# Patient Record
Sex: MALE | Race: WHITE | NOT HISPANIC OR LATINO | URBAN - METROPOLITAN AREA
[De-identification: names, ages, dates, MRNs, and addresses within clinical notes are randomized per-mention and may not be internally consistent; named-entity substitution may affect disease eponyms.]

---

## 2018-08-27 ENCOUNTER — TRANSCRIBE ORDERS (OUTPATIENT)
Dept: PREADMISSION TESTING | Facility: HOSPITAL | Age: 53
End: 2018-08-27

## 2018-08-27 ENCOUNTER — HOSPITAL ENCOUNTER (OUTPATIENT)
Dept: CARDIOLOGY | Facility: HOSPITAL | Age: 53
Discharge: HOME | End: 2018-08-27
Attending: UROLOGY
Payer: COMMERCIAL

## 2018-08-27 ENCOUNTER — APPOINTMENT (OUTPATIENT)
Dept: LAB | Facility: HOSPITAL | Age: 53
End: 2018-08-27
Attending: UROLOGY
Payer: COMMERCIAL

## 2018-08-27 ENCOUNTER — OFFICE VISIT (OUTPATIENT)
Dept: PREADMISSION TESTING | Facility: HOSPITAL | Age: 53
End: 2018-08-27
Attending: UROLOGY
Payer: COMMERCIAL

## 2018-08-27 VITALS
DIASTOLIC BLOOD PRESSURE: 100 MMHG | HEIGHT: 72 IN | RESPIRATION RATE: 16 BRPM | BODY MASS INDEX: 30.71 KG/M2 | WEIGHT: 226.7 LBS | TEMPERATURE: 97.4 F | HEART RATE: 75 BPM | SYSTOLIC BLOOD PRESSURE: 138 MMHG

## 2018-08-27 DIAGNOSIS — C61 MALIGNANT NEOPLASM OF PROSTATE (CMS/HCC): ICD-10-CM

## 2018-08-27 DIAGNOSIS — Z01.818 ENCOUNTER FOR PREADMISSION TESTING: Primary | ICD-10-CM

## 2018-08-27 DIAGNOSIS — C61 MALIGNANT NEOPLASM OF PROSTATE (CMS/HCC): Primary | ICD-10-CM

## 2018-08-27 PROBLEM — Z21 HIV (HUMAN IMMUNODEFICIENCY VIRUS INFECTION) (CMS/HCC): Status: ACTIVE | Noted: 2018-08-27

## 2018-08-27 PROBLEM — R03.0 ELEVATED BP WITHOUT DIAGNOSIS OF HYPERTENSION: Status: ACTIVE | Noted: 2018-08-27

## 2018-08-27 PROBLEM — I45.10 RIGHT BUNDLE BRANCH BLOCK (RBBB) ON ELECTROCARDIOGRAM (ECG): Status: ACTIVE | Noted: 2018-08-27

## 2018-08-27 LAB
ABO + RH BLD: NORMAL
ANION GAP SERPL CALC-SCNC: 10 MEQ/L (ref 3–15)
ATRIAL RATE: 75
BASOPHILS # BLD: 0.08 K/UL (ref 0.01–0.1)
BASOPHILS NFR BLD: 1.1 %
BILIRUB UR QL STRIP.AUTO: NEGATIVE MG/DL
BLD GP AB SCN SERPL QL: NEGATIVE
BLOOD BANK CMNT PATIENT-IMP: NORMAL
BUN SERPL-MCNC: 14 MG/DL (ref 8–20)
CALCIUM SERPL-MCNC: 9.4 MG/DL (ref 8.9–10.3)
CHLORIDE SERPL-SCNC: 99 MMOL/L (ref 98–109)
CLARITY UR REFRACT.AUTO: CLEAR
CO2 SERPL-SCNC: 28 MMOL/L (ref 22–32)
COLOR UR AUTO: YELLOW
CREAT SERPL-MCNC: 1.3 MG/DL (ref 0.8–1.3)
D AG BLD QL: POSITIVE
DIFFERENTIAL METHOD BLD: NORMAL
EOSINOPHIL # BLD: 0.27 K/UL (ref 0.04–0.54)
EOSINOPHIL NFR BLD: 3.6 %
ERYTHROCYTE [DISTWIDTH] IN BLOOD BY AUTOMATED COUNT: 12.5 % (ref 11.6–14.4)
GFR SERPL CREATININE-BSD FRML MDRD: 57.7 ML/MIN/1.73M*2
GLUCOSE SERPL-MCNC: 96 MG/DL (ref 70–99)
GLUCOSE UR STRIP.AUTO-MCNC: NEGATIVE MG/DL
HCT VFR BLDCO AUTO: 41.4 % (ref 40.1–51)
HGB BLD-MCNC: 13.6 G/DL (ref 13.7–17.5)
HGB UR QL STRIP.AUTO: NEGATIVE
IMM GRANULOCYTES # BLD AUTO: 0.04 K/UL (ref 0–0.08)
IMM GRANULOCYTES NFR BLD AUTO: 0.5 %
KETONES UR STRIP.AUTO-MCNC: NEGATIVE MG/DL
LABORATORY COMMENT REPORT: NORMAL
LEUKOCYTE ESTERASE UR QL STRIP.AUTO: NEGATIVE
LYMPHOCYTES # BLD: 1.83 K/UL (ref 1.2–3.5)
LYMPHOCYTES NFR BLD: 24.5 %
MCH RBC QN AUTO: 31 PG (ref 28–33.2)
MCHC RBC AUTO-ENTMCNC: 32.9 G/DL (ref 32.2–36.5)
MCV RBC AUTO: 94.3 FL (ref 83–98)
MONOCYTES # BLD: 0.75 K/UL (ref 0.3–1)
MONOCYTES NFR BLD: 10.1 %
NEUTROPHILS # BLD: 4.49 K/UL (ref 1.7–7)
NEUTS SEG NFR BLD: 60.2 %
NITRITE UR QL STRIP.AUTO: NEGATIVE
NRBC BLD-RTO: 0 %
P AXIS: 27
PDW BLD AUTO: 10.2 FL (ref 9.4–12.4)
PH UR STRIP.AUTO: 6 [PH]
PLATELET # BLD AUTO: 263 K/UL (ref 150–350)
POTASSIUM SERPL-SCNC: 4 MMOL/L (ref 3.6–5.1)
PR INTERVAL: 198
PROT UR QL STRIP.AUTO: NEGATIVE
QRS DURATION: 174
QT INTERVAL: 448
QTC CALCULATION(BAZETT): 500
R AXIS: 66
RBC # BLD AUTO: 4.39 M/UL (ref 4.5–5.8)
SODIUM SERPL-SCNC: 137 MMOL/L (ref 136–144)
SP GR UR REFRACT.AUTO: 1.02
T WAVE AXIS: 17
UROBILINOGEN UR STRIP-ACNC: 0.2 EU/DL
VENTRICULAR RATE: 75
WBC # BLD AUTO: 7.46 K/UL (ref 3.8–10.5)

## 2018-08-27 PROCEDURE — 93010 ELECTROCARDIOGRAM REPORT: CPT | Performed by: INTERNAL MEDICINE

## 2018-08-27 PROCEDURE — 81003 URINALYSIS AUTO W/O SCOPE: CPT

## 2018-08-27 PROCEDURE — 36415 COLL VENOUS BLD VENIPUNCTURE: CPT

## 2018-08-27 PROCEDURE — 86901 BLOOD TYPING SEROLOGIC RH(D): CPT

## 2018-08-27 PROCEDURE — 87086 URINE CULTURE/COLONY COUNT: CPT

## 2018-08-27 PROCEDURE — 93005 ELECTROCARDIOGRAM TRACING: CPT

## 2018-08-27 PROCEDURE — 80048 BASIC METABOLIC PNL TOTAL CA: CPT | Mod: 59

## 2018-08-27 PROCEDURE — 99244 OFF/OP CNSLTJ NEW/EST MOD 40: CPT | Performed by: HOSPITALIST

## 2018-08-27 PROCEDURE — 85025 COMPLETE CBC W/AUTO DIFF WBC: CPT

## 2018-08-27 RX ORDER — ALLOPURINOL 100 MG/1
200 TABLET ORAL DAILY
COMMUNITY

## 2018-08-27 RX ORDER — LANSOPRAZOLE 30 MG/1
30 CAPSULE, DELAYED RELEASE ORAL
COMMUNITY

## 2018-08-27 NOTE — ASSESSMENT & PLAN NOTE
EKG revealed normal sinus rhythm at a rate of 75 with a QTc of 500 and a right bundle branch block.  We have no previous EKG to compare, so it is unclear if this right bundle is new.  He currently has no cardiac symptoms concerning for ischemic disease at this time.  I do not believe this RBBB is significantly pathologic.  Please obtain new EKG prior to surgery to compare.  Please be cautious of using any QTc prolonging agents if his QTc remains prolonged prior to surgery.

## 2018-08-27 NOTE — ASSESSMENT & PLAN NOTE
Patient is currently on HAART therapy (Triumeq) being treated at [facility].  He is compliant with medications.  He states his counts are stable as this is viral load.  We have no labs available in our system for him.  He should continue his HAART therapy in the hospital.  He was instructed to bring his own medication as this is not likely not formulary at [facility].

## 2018-08-27 NOTE — PRE-PROCEDURE INSTRUCTIONS
1. We will call you between 3 pm and 7 pm on August 31, 2018 to determine that arrival time for your procedure. If you do not hear by 4:30 PM. Please call [phone] for arrival time.    2. Please report to [place] in [place] ROSSANA / ana, walk into [facility] and report to the admission desk on first floor on the day of your procedure.   3. Please follow the following fasting guidelines:   Nothing to eat or drink after midnight unless otherwise instructed by  your physician. No gum mints candy. Brush teeth/Rinse Mouth   4. Early on the morning of the procedure please take your usual dose of the listed medications with a sip of water:    Prevacid  Bring Triumeq with you       5. Other Instructions: No asprin aleve ibuprofen. Tylenol OK   6. If you develop a cold, cough, fever, rash, or other symptom prior to the data of the procedure, please report it to your physician immediately.   7. If you need to cancel the procedure for any reason, please contact your physician or call the unit listed above.   8. Make arrangements to have someone drive you home from the procedure. If you have not arranged for transportation home, your surgery may be cancelled.    9. You may not take public transportation unless accompanied by a responsible person.   10. You may not drive a car or operate complex or potentially dangerous machinery for 24 hours following anesthesia and/or sedation.   11. If it is medically necessary for you to have a longer stay, you will be informed as soon as the decision is made.   12. Do not wear or bring anything of value to the hospital including jewelry of any kind. Do not wear make-up or contact lenses. DO bring your glasses and hearing aid.   13. No lotion, creams, powders, or oils on skin the morning of procedure    14. Dress in comfortable clothes.   15.  If instructed, please bring a copy of your Advanced Directive (Living Will/Durable Power of ) on the day of your procedure.      Pre operative  instructions given as per protocol.  Form explained by: CORNELIO Samuel     I have read and understand the above information. I have had sufficient opportunity to ask questions I might have and they have been answered to my satisfaction. I agree to comply with the Patient Responsibilities listed above and have received a copy of this form.

## 2018-08-27 NOTE — ASSESSMENT & PLAN NOTE
Patient presents for a preoperative exam prior to his robot-assisted prostatectomy with pelvic lymph node dissection.  Remainder of his medical issues are as below.

## 2018-08-27 NOTE — CONSULTS
[facility] Medicine Service -  Pre-Operative Consultation       Patient Name: Celestino Hawkins  Referring Surgeon: Dr. Rafita Cole    Reason for Referral: Pre-Operative Evaluation  Surgical Procedure: Da Trevon robot-assisted prostatectomy with possible pelvic lymph node dissection.  Operative Date: 9/4/2018.  Other Providers:      PCP: Pt States, No Pcp          HISTORY OF PRESENT ILLNESS      Celestino Hawkins is a 52 y.o. male presenting today to the [facility] at [facility] for pre-operative evaluation prior to planned surgery.    He currently feels well at this time.  He denies any urinary symptoms of frequency, urgency, hematuria, or dysuria.  Currently his only medication is Triumeq for his HIV.    In regards to medical history:  · HIV currently on HAART followed by infectious disease at [facility]  · MRSA of his lower extremity diagnosed in 2011    The patient denies any current or recent chest pain or pressure, dyspnea, cough, sputum, fevers, chills, abdominal pain, nausea, vomiting, diarrhea or other symptoms.     Functionally, the patient is able to ascend a flight or so of stairs with no dyspnea or chest pain.     The patient denies, on specific questioning, the following:  No history of MI, arrhythmia,or CHF.  No history of MILENA.  No history of DVT/PE.  No history of COPD.  No history of CVA.  No history of DM.   No history of CKD.     PAST MEDICAL AND SURGICAL HISTORY      Past Medical History:   Diagnosis Date   • Borderline hypertension     no meds   • Diarrhea due to drug    • HIV (human immunodeficiency virus infection) (CMS/HCC) (HCC) 2000   • MRSA (methicillin resistant Staphylococcus aureus) 2011    RLE   • PONV (postoperative nausea and vomiting)        Past Surgical History:   Procedure Laterality Date   • COLONOSCOPY     • LUMBAR EPIDURAL INJECTION     • WISDOM TOOTH EXTRACTION         MEDICATIONS        Current Outpatient  Prescriptions:   •  abacavir-dolutegravir-lamivud (TRIUMEQ) 600- mg tablet tablet, Take by mouth daily., Disp: , Rfl:   •  allopurinol (ZYLOPRIM) 100 mg tablet, Take 200 mg by mouth daily., Disp: , Rfl:   •  crofelemer (MYTESI) 125 mg tablet,delayed release (DR/EC), Take 125 mg by mouth 2 (two) times a day., Disp: , Rfl:   •  lansoprazole (PREVACID) 30 mg capsule, Take 30 mg by mouth daily before breakfast., Disp: , Rfl:     ALLERGIES      Patient has no known allergies.    FAMILY HISTORY      family history is not on file.    Denies any prior known family history of DVTs/PEs/clotting disorder    SOCIAL HISTORY      Social History   Substance Use Topics   • Smoking status: Never Smoker   • Smokeless tobacco: Never Used   • Alcohol use No       REVIEW OF SYSTEMS      All other systems reviewed and negative except as noted in HPI    PHYSICAL EXAMINATION      BP (!) 138/100 (BP Location: Left upper arm, Patient Position: Sitting)   Pulse 75   Temp 36.3 °C (97.4 °F)   Resp 16 Comment: 96%  Ht 1.829 m (6')   Wt 103 kg (226 lb 11.2 oz)   BMI 30.75 kg/m²   Body mass index is 30.75 kg/m².  GEN: well-developed and well-nourished; not in acute distress  HEENT: normocephalic; atraumatic  NECK: no JVD; no bruits  CARDIO: regular rate and rhythm; no murmurs or rubs  RESP: clear to auscultation bilaterally; no rales, rhonchi, or wheezes  ABD: soft, non-distended, non-tender, normal bowel sounds  EXT: no cyanosis, clubbing, or edema  SKIN: clean, dry, warm, and intact  MUSCULOSKELETAL: no injury or deformity  NEURO: alert and oriented x 3; nonfocal  BEHAVIOR/EMOTIONAL: appropriate; cooperative    LABS / EKG        Labs  Lab Results   Component Value Date     08/27/2018    K 4.0 08/27/2018    CL 99 08/27/2018    BUN 14 08/27/2018    CREATININE 1.3 08/27/2018    WBC 7.46 08/27/2018    HGB 13.6 (L) 08/27/2018    HCT 41.4 08/27/2018     08/27/2018       ECG/Telemetry  Normal sinus rhythm at a rate of 75 bpm,  QTC is 500, right bundle branch block is present.  There are no old EKGs to compare to this.    ASSESSMENT AND PLAN         Preoperative examination  Patient presents for a preoperative exam prior to his robot-assisted prostatectomy with pelvic lymph node dissection.  Remainder of his medical issues are as below.    HIV (human immunodeficiency virus infection) (CMS/HCC) (HCC)  Patient is currently on HAART therapy (Triumeq) being treated at [facility].  He is compliant with medications.  He states his counts are stable as this is viral load.  We have no labs available in our system for him.  He should continue his HAART therapy in the hospital.  He was instructed to bring his own medication as this is not likely not formulary at [facility].    Prostate cancer (CMS/HCC) (HCC)  Prostate cancer officially diagnosed in 5/2018 after biopsy due to an elevated PSA over the past year.  We have no record of pathology reports in our EMR.  He is set to undergo a robot-assisted prostatectomy with possible lymph node dissection on 9/4/2018 with Dr. Rafita Cole.    Right bundle branch block (RBBB) on electrocardiogram (ECG)  EKG revealed normal sinus rhythm at a rate of 75 with a QTc of 500 and a right bundle branch block.  We have no previous EKG to compare, so it is unclear if this right bundle is new.  He currently has no cardiac symptoms concerning for ischemic disease at this time.  I do not believe this RBBB is significantly pathologic.  Please obtain new EKG prior to surgery to compare.  Please be cautious of using any QTc prolonging agents if his QTc remains prolonged prior to surgery.       In regards to perioperative cardiac risk:  The patient denies any history of ischemic heart disease, denies any history of CHF, denies any history of CVA, is not on pre-operative treatment with insulin, and does not have a pre-operative creatinine > 2 mg/dL.   The Revised Cardiac Risk Index (RCRI) for this patient indicates low risk.      Further comments:  Resume supplements when OK with surgical team.  I would encourage incentive spirometry to assist with minimizing alen-operative pulmonary risk.  DVT prophylaxis and timing of such per the discretion of the surgeon.     Please do not hesitate to contact [facility] during the upcoming hospitalization with any questions or concerns.     Ramos Villatoro MD  8/28/2018

## 2018-08-27 NOTE — ASSESSMENT & PLAN NOTE
Prostate cancer officially diagnosed in 5/2018 after biopsy due to an elevated PSA over the past year.  We have no record of pathology reports in our EMR.  He is set to undergo a robot-assisted prostatectomy with possible lymph node dissection on 9/4/2018 with Dr. Rafita Cole.

## 2018-08-28 LAB — BACTERIA UR CULT: NORMAL

## 2018-09-04 ENCOUNTER — ANESTHESIA EVENT (INPATIENT)
Dept: OPERATING ROOM | Facility: HOSPITAL | Age: 53
DRG: 707 | End: 2018-09-04
Payer: COMMERCIAL

## 2018-09-04 ENCOUNTER — ANESTHESIA (INPATIENT)
Dept: OPERATING ROOM | Facility: HOSPITAL | Age: 53
DRG: 707 | End: 2018-09-04
Payer: COMMERCIAL

## 2018-09-04 ENCOUNTER — HOSPITAL ENCOUNTER (INPATIENT)
Facility: HOSPITAL | Age: 53
LOS: 1 days | Discharge: HOME | DRG: 707 | End: 2018-09-05
Attending: UROLOGY | Admitting: UROLOGY
Payer: COMMERCIAL

## 2018-09-04 DIAGNOSIS — C61 PROSTATE CANCER (CMS/HCC): Primary | ICD-10-CM

## 2018-09-04 DIAGNOSIS — C61 MALIGNANT NEOPLASM PROSTATE (CMS/HCC): ICD-10-CM

## 2018-09-04 LAB
ABO + RH BLD: NORMAL
D AG BLD QL: POSITIVE
LABORATORY COMMENT REPORT: NORMAL

## 2018-09-04 PROCEDURE — 63600000 HC DRUGS/DETAIL CODE: Performed by: UROLOGY

## 2018-09-04 PROCEDURE — 25800000 HC PHARMACY IV SOLUTIONS: Performed by: UROLOGY

## 2018-09-04 PROCEDURE — 63700000 HC SELF-ADMINISTRABLE DRUG: Performed by: UROLOGY

## 2018-09-04 PROCEDURE — 63600000 HC DRUGS/DETAIL CODE

## 2018-09-04 PROCEDURE — 0VT04ZZ RESECTION OF PROSTATE, PERCUTANEOUS ENDOSCOPIC APPROACH: ICD-10-PCS | Performed by: UROLOGY

## 2018-09-04 PROCEDURE — 63600000 HC DRUGS/DETAIL CODE: Performed by: ANESTHESIOLOGY

## 2018-09-04 PROCEDURE — 12000000 HC ROOM AND CARE MED/SURG

## 2018-09-04 PROCEDURE — 25000000 HC PHARMACY GENERAL: Performed by: UROLOGY

## 2018-09-04 PROCEDURE — 25000000 HC PHARMACY GENERAL: Performed by: NURSE ANESTHETIST, CERTIFIED REGISTERED

## 2018-09-04 PROCEDURE — 63600000 HC DRUGS/DETAIL CODE: Performed by: NURSE ANESTHETIST, CERTIFIED REGISTERED

## 2018-09-04 PROCEDURE — 27200000 HC STERILE SUPPLY: Performed by: UROLOGY

## 2018-09-04 PROCEDURE — 8E0W4CZ ROBOTIC ASSISTED PROCEDURE OF TRUNK REGION, PERCUTANEOUS ENDOSCOPIC APPROACH: ICD-10-PCS | Performed by: UROLOGY

## 2018-09-04 PROCEDURE — 71000011 HC PACU PHASE 1 EA ADDL MIN: Performed by: UROLOGY

## 2018-09-04 PROCEDURE — 36000016 HC OR LEVEL 6 EA ADDL MIN: Performed by: UROLOGY

## 2018-09-04 PROCEDURE — 88309 TISSUE EXAM BY PATHOLOGIST: CPT | Performed by: UROLOGY

## 2018-09-04 PROCEDURE — 36000006 HC OR LEVEL 6 INITIAL 30MIN: Performed by: UROLOGY

## 2018-09-04 PROCEDURE — 37000001 HC ANESTHESIA GENERAL: Performed by: UROLOGY

## 2018-09-04 PROCEDURE — 36415 COLL VENOUS BLD VENIPUNCTURE: CPT | Performed by: UROLOGY

## 2018-09-04 PROCEDURE — 71000001 HC PACU PHASE 1 INITIAL 30MIN: Performed by: UROLOGY

## 2018-09-04 RX ORDER — POLYETHYLENE GLYCOL 3350 17 G/17G
17 POWDER, FOR SOLUTION ORAL DAILY
Status: DISCONTINUED | OUTPATIENT
Start: 2018-09-04 | End: 2018-09-05 | Stop reason: HOSPADM

## 2018-09-04 RX ORDER — ABACAVIR AND LAMIVUDINE 600; 300 MG/1; MG/1
1 TABLET, FILM COATED ORAL DAILY
Status: DISCONTINUED | OUTPATIENT
Start: 2018-09-04 | End: 2018-09-05 | Stop reason: HOSPADM

## 2018-09-04 RX ORDER — DEXTROSE 40 %
15-30 GEL (GRAM) ORAL AS NEEDED
Status: DISCONTINUED | OUTPATIENT
Start: 2018-09-04 | End: 2018-09-05 | Stop reason: HOSPADM

## 2018-09-04 RX ORDER — IBUPROFEN 200 MG
16-32 TABLET ORAL AS NEEDED
Status: DISCONTINUED | OUTPATIENT
Start: 2018-09-04 | End: 2018-09-05 | Stop reason: HOSPADM

## 2018-09-04 RX ORDER — ACETAMINOPHEN 325 MG/1
650 TABLET ORAL EVERY 4 HOURS PRN
Status: DISCONTINUED | OUTPATIENT
Start: 2018-09-04 | End: 2018-09-05 | Stop reason: HOSPADM

## 2018-09-04 RX ORDER — HYDROMORPHONE HYDROCHLORIDE 2 MG/ML
INJECTION, SOLUTION INTRAMUSCULAR; INTRAVENOUS; SUBCUTANEOUS AS NEEDED
Status: DISCONTINUED | OUTPATIENT
Start: 2018-09-04 | End: 2018-09-04 | Stop reason: SURG

## 2018-09-04 RX ORDER — ONDANSETRON HYDROCHLORIDE 2 MG/ML
4 INJECTION, SOLUTION INTRAVENOUS
Status: DISCONTINUED | OUTPATIENT
Start: 2018-09-04 | End: 2018-09-04 | Stop reason: HOSPADM

## 2018-09-04 RX ORDER — HYDRALAZINE HYDROCHLORIDE 20 MG/ML
5 INJECTION INTRAMUSCULAR; INTRAVENOUS
Status: COMPLETED | OUTPATIENT
Start: 2018-09-04 | End: 2018-09-04

## 2018-09-04 RX ORDER — ROCURONIUM BROMIDE 10 MG/ML
INJECTION, SOLUTION INTRAVENOUS AS NEEDED
Status: DISCONTINUED | OUTPATIENT
Start: 2018-09-04 | End: 2018-09-04 | Stop reason: SURG

## 2018-09-04 RX ORDER — DEXAMETHASONE SODIUM PHOSPHATE 4 MG/ML
INJECTION, SOLUTION INTRA-ARTICULAR; INTRALESIONAL; INTRAMUSCULAR; INTRAVENOUS; SOFT TISSUE AS NEEDED
Status: DISCONTINUED | OUTPATIENT
Start: 2018-09-04 | End: 2018-09-04 | Stop reason: SURG

## 2018-09-04 RX ORDER — BUPIVACAINE HYDROCHLORIDE 5 MG/ML
INJECTION, SOLUTION PERINEURAL AS NEEDED
Status: DISCONTINUED | OUTPATIENT
Start: 2018-09-04 | End: 2018-09-04 | Stop reason: HOSPADM

## 2018-09-04 RX ORDER — PANTOPRAZOLE SODIUM 40 MG/1
40 TABLET, DELAYED RELEASE ORAL DAILY
Status: DISCONTINUED | OUTPATIENT
Start: 2018-09-04 | End: 2018-09-05 | Stop reason: HOSPADM

## 2018-09-04 RX ORDER — FENTANYL CITRATE 50 UG/ML
50 INJECTION, SOLUTION INTRAMUSCULAR; INTRAVENOUS
Status: DISCONTINUED | OUTPATIENT
Start: 2018-09-04 | End: 2018-09-04 | Stop reason: HOSPADM

## 2018-09-04 RX ORDER — ASPIRIN 81 MG/1
81 TABLET ORAL DAILY
COMMUNITY

## 2018-09-04 RX ORDER — ALLOPURINOL 100 MG/1
200 TABLET ORAL DAILY
Status: DISCONTINUED | OUTPATIENT
Start: 2018-09-04 | End: 2018-09-05 | Stop reason: HOSPADM

## 2018-09-04 RX ORDER — SODIUM CHLORIDE 9 MG/ML
INJECTION, SOLUTION INTRAVENOUS CONTINUOUS
Status: DISCONTINUED | OUTPATIENT
Start: 2018-09-04 | End: 2018-09-04 | Stop reason: HOSPADM

## 2018-09-04 RX ORDER — GLYCOPYRROLATE 0.6MG/3ML
SYRINGE (ML) INTRAVENOUS AS NEEDED
Status: DISCONTINUED | OUTPATIENT
Start: 2018-09-04 | End: 2018-09-04 | Stop reason: SURG

## 2018-09-04 RX ORDER — MIDAZOLAM HYDROCHLORIDE 2 MG/2ML
INJECTION, SOLUTION INTRAMUSCULAR; INTRAVENOUS AS NEEDED
Status: DISCONTINUED | OUTPATIENT
Start: 2018-09-04 | End: 2018-09-04 | Stop reason: SURG

## 2018-09-04 RX ORDER — EPHEDRINE SULFATE 50 MG/ML
INJECTION, SOLUTION INTRAVENOUS AS NEEDED
Status: DISCONTINUED | OUTPATIENT
Start: 2018-09-04 | End: 2018-09-04 | Stop reason: SURG

## 2018-09-04 RX ORDER — DEXTROSE 50 % IN WATER (D50W) INTRAVENOUS SYRINGE
25 AS NEEDED
Status: DISCONTINUED | OUTPATIENT
Start: 2018-09-04 | End: 2018-09-05 | Stop reason: HOSPADM

## 2018-09-04 RX ORDER — NEOSTIGMINE METHYLSULFATE 1 MG/ML
INJECTION INTRAVENOUS AS NEEDED
Status: DISCONTINUED | OUTPATIENT
Start: 2018-09-04 | End: 2018-09-04 | Stop reason: SURG

## 2018-09-04 RX ORDER — ALUMINUM HYDROXIDE, MAGNESIUM HYDROXIDE, AND SIMETHICONE 1200; 120; 1200 MG/30ML; MG/30ML; MG/30ML
30 SUSPENSION ORAL EVERY 4 HOURS PRN
Status: DISCONTINUED | OUTPATIENT
Start: 2018-09-04 | End: 2018-09-05 | Stop reason: HOSPADM

## 2018-09-04 RX ORDER — KETOROLAC TROMETHAMINE 15 MG/ML
15 INJECTION, SOLUTION INTRAMUSCULAR; INTRAVENOUS EVERY 6 HOURS
Status: DISCONTINUED | OUTPATIENT
Start: 2018-09-04 | End: 2018-09-05 | Stop reason: HOSPADM

## 2018-09-04 RX ORDER — ONDANSETRON HYDROCHLORIDE 2 MG/ML
INJECTION, SOLUTION INTRAVENOUS AS NEEDED
Status: DISCONTINUED | OUTPATIENT
Start: 2018-09-04 | End: 2018-09-04 | Stop reason: SURG

## 2018-09-04 RX ORDER — CEFAZOLIN SODIUM/WATER 1 G/10 ML
2 SYRINGE (ML) INTRAVENOUS
Status: COMPLETED | OUTPATIENT
Start: 2018-09-04 | End: 2018-09-04

## 2018-09-04 RX ORDER — OXYCODONE AND ACETAMINOPHEN 5; 325 MG/1; MG/1
1-2 TABLET ORAL EVERY 4 HOURS PRN
Status: DISCONTINUED | OUTPATIENT
Start: 2018-09-04 | End: 2018-09-05 | Stop reason: HOSPADM

## 2018-09-04 RX ORDER — CEFAZOLIN SODIUM/WATER 2 G/20 ML
2 SYRINGE (ML) INTRAVENOUS
Status: COMPLETED | OUTPATIENT
Start: 2018-09-04 | End: 2018-09-05

## 2018-09-04 RX ORDER — PROPOFOL 10 MG/ML
INJECTION, EMULSION INTRAVENOUS AS NEEDED
Status: DISCONTINUED | OUTPATIENT
Start: 2018-09-04 | End: 2018-09-04 | Stop reason: SURG

## 2018-09-04 RX ORDER — HYDROMORPHONE HYDROCHLORIDE 2 MG/ML
0.5 INJECTION, SOLUTION INTRAMUSCULAR; INTRAVENOUS; SUBCUTANEOUS
Status: DISCONTINUED | OUTPATIENT
Start: 2018-09-04 | End: 2018-09-04 | Stop reason: HOSPADM

## 2018-09-04 RX ORDER — KETOROLAC TROMETHAMINE 15 MG/ML
INJECTION, SOLUTION INTRAMUSCULAR; INTRAVENOUS
Status: COMPLETED
Start: 2018-09-04 | End: 2018-09-04

## 2018-09-04 RX ORDER — FENTANYL CITRATE 50 UG/ML
INJECTION, SOLUTION INTRAMUSCULAR; INTRAVENOUS AS NEEDED
Status: DISCONTINUED | OUTPATIENT
Start: 2018-09-04 | End: 2018-09-04 | Stop reason: SURG

## 2018-09-04 RX ORDER — HYDROMORPHONE HYDROCHLORIDE 1 MG/ML
.25-.5 INJECTION, SOLUTION INTRAMUSCULAR; INTRAVENOUS; SUBCUTANEOUS
Status: DISCONTINUED | OUTPATIENT
Start: 2018-09-04 | End: 2018-09-05 | Stop reason: HOSPADM

## 2018-09-04 RX ORDER — DEXTROSE MONOHYDRATE, SODIUM CHLORIDE, AND POTASSIUM CHLORIDE 50; 1.49; 4.5 G/1000ML; G/1000ML; G/1000ML
INJECTION, SOLUTION INTRAVENOUS CONTINUOUS
Status: DISCONTINUED | OUTPATIENT
Start: 2018-09-04 | End: 2018-09-05

## 2018-09-04 RX ORDER — LIDOCAINE HYDROCHLORIDE 10 MG/ML
INJECTION, SOLUTION INFILTRATION; PERINEURAL AS NEEDED
Status: DISCONTINUED | OUTPATIENT
Start: 2018-09-04 | End: 2018-09-04 | Stop reason: SURG

## 2018-09-04 RX ADMIN — KETOROLAC TROMETHAMINE 15 MG: 15 INJECTION, SOLUTION INTRAMUSCULAR; INTRAVENOUS at 23:49

## 2018-09-04 RX ADMIN — KETOROLAC TROMETHAMINE 15 MG: 15 INJECTION, SOLUTION INTRAMUSCULAR; INTRAVENOUS at 18:03

## 2018-09-04 RX ADMIN — FENTANYL CITRATE 50 MCG: 50 INJECTION, SOLUTION INTRAMUSCULAR; INTRAVENOUS at 15:15

## 2018-09-04 RX ADMIN — Medication 2 G: at 11:54

## 2018-09-04 RX ADMIN — POTASSIUM CHLORIDE, DEXTROSE MONOHYDRATE AND SODIUM CHLORIDE: 150; 5; 450 INJECTION, SOLUTION INTRAVENOUS at 16:01

## 2018-09-04 RX ADMIN — Medication 3 MG: at 14:54

## 2018-09-04 RX ADMIN — HYDROMORPHONE HYDROCHLORIDE 0.5 MG: 2 INJECTION, SOLUTION INTRAMUSCULAR; INTRAVENOUS; SUBCUTANEOUS at 15:31

## 2018-09-04 RX ADMIN — EPHEDRINE SULFATE 10 MG: 50 INJECTION INTRAVENOUS at 12:18

## 2018-09-04 RX ADMIN — FENTANYL CITRATE 50 MCG: 50 INJECTION, SOLUTION INTRAMUSCULAR; INTRAVENOUS at 12:20

## 2018-09-04 RX ADMIN — DEXAMETHASONE SODIUM PHOSPHATE 4 MG: 4 INJECTION, SOLUTION INTRAMUSCULAR; INTRAVENOUS at 12:00

## 2018-09-04 RX ADMIN — LIDOCAINE HYDROCHLORIDE 3 ML: 10 INJECTION, SOLUTION INFILTRATION; PERINEURAL at 11:54

## 2018-09-04 RX ADMIN — PROPOFOL 200 MG: 10 INJECTION, EMULSION INTRAVENOUS at 11:54

## 2018-09-04 RX ADMIN — ALLOPURINOL 200 MG: 100 TABLET ORAL at 20:29

## 2018-09-04 RX ADMIN — Medication 2 G: at 20:38

## 2018-09-04 RX ADMIN — FENTANYL CITRATE 50 MCG: 50 INJECTION, SOLUTION INTRAMUSCULAR; INTRAVENOUS at 13:58

## 2018-09-04 RX ADMIN — MIDAZOLAM HYDROCHLORIDE 2 MG: 1 INJECTION, SOLUTION INTRAMUSCULAR; INTRAVENOUS at 11:40

## 2018-09-04 RX ADMIN — POLYETHYLENE GLYCOL 3350 17 G: 17 POWDER, FOR SOLUTION ORAL at 18:05

## 2018-09-04 RX ADMIN — HYDRALAZINE HYDROCHLORIDE 5 MG: 20 INJECTION INTRAMUSCULAR; INTRAVENOUS at 15:42

## 2018-09-04 RX ADMIN — ONDANSETRON 4 MG: 2 INJECTION INTRAMUSCULAR; INTRAVENOUS at 14:40

## 2018-09-04 RX ADMIN — ROCURONIUM BROMIDE 10 MG: 10 INJECTION, SOLUTION INTRAVENOUS at 13:23

## 2018-09-04 RX ADMIN — GLYCOPYRROLATE 0.6 MG: 0.2 INJECTION INTRAMUSCULAR; INTRAVENOUS at 14:54

## 2018-09-04 RX ADMIN — HYDROMORPHONE HYDROCHLORIDE 0.5 MG: 2 INJECTION, SOLUTION INTRAMUSCULAR; INTRAVENOUS; SUBCUTANEOUS at 13:07

## 2018-09-04 RX ADMIN — FENTANYL CITRATE 50 MCG: 50 INJECTION INTRAMUSCULAR; INTRAVENOUS at 15:31

## 2018-09-04 RX ADMIN — HYDROMORPHONE HYDROCHLORIDE 0.5 MG: 2 INJECTION, SOLUTION INTRAMUSCULAR; INTRAVENOUS; SUBCUTANEOUS at 13:26

## 2018-09-04 RX ADMIN — PANTOPRAZOLE SODIUM 40 MG: 40 TABLET, DELAYED RELEASE ORAL at 18:26

## 2018-09-04 RX ADMIN — DOLUTEGRAVIR SODIUM 50 MG: 50 TABLET, FILM COATED ORAL at 20:31

## 2018-09-04 RX ADMIN — HYDROMORPHONE HYDROCHLORIDE 0.5 MG: 1 INJECTION, SOLUTION INTRAMUSCULAR; INTRAVENOUS; SUBCUTANEOUS at 21:08

## 2018-09-04 RX ADMIN — HYDROMORPHONE HYDROCHLORIDE 0.5 MG: 2 INJECTION, SOLUTION INTRAMUSCULAR; INTRAVENOUS; SUBCUTANEOUS at 12:48

## 2018-09-04 RX ADMIN — HYDRALAZINE HYDROCHLORIDE 5 MG: 20 INJECTION INTRAMUSCULAR; INTRAVENOUS at 16:11

## 2018-09-04 RX ADMIN — ROCURONIUM BROMIDE 50 MG: 10 INJECTION, SOLUTION INTRAVENOUS at 11:54

## 2018-09-04 RX ADMIN — SODIUM CHLORIDE: 9 INJECTION, SOLUTION INTRAVENOUS at 10:32

## 2018-09-04 RX ADMIN — FENTANYL CITRATE 50 MCG: 50 INJECTION, SOLUTION INTRAMUSCULAR; INTRAVENOUS at 11:54

## 2018-09-04 RX ADMIN — ONDANSETRON 4 MG: 2 INJECTION INTRAMUSCULAR; INTRAVENOUS at 12:00

## 2018-09-04 RX ADMIN — POTASSIUM CHLORIDE, DEXTROSE MONOHYDRATE AND SODIUM CHLORIDE: 150; 5; 450 INJECTION, SOLUTION INTRAVENOUS at 23:50

## 2018-09-04 RX ADMIN — ABACAVIR AND LAMIVUDINE 1 TABLET: 600; 300 TABLET, FILM COATED ORAL at 18:26

## 2018-09-04 RX ADMIN — HYDROMORPHONE HYDROCHLORIDE 0.5 MG: 2 INJECTION, SOLUTION INTRAMUSCULAR; INTRAVENOUS; SUBCUTANEOUS at 12:37

## 2018-09-04 ASSESSMENT — COGNITIVE AND FUNCTIONAL STATUS - GENERAL
DRESSING REGULAR UPPER BODY CLOTHING: 3 - A LITTLE
WALKING IN HOSPITAL ROOM: 3 - A LITTLE
HELP NEEDED FOR PERSONAL GROOMING: 3 - A LITTLE
MOVING TO AND FROM BED TO CHAIR: 3 - A LITTLE
DRESSING REGULAR LOWER BODY CLOTHING: 3 - A LITTLE
EATING MEALS: 4 - NONE
CLIMB 3 TO 5 STEPS WITH RAILING: 3 - A LITTLE
TOILETING: 3 - A LITTLE
STANDING UP FROM CHAIR USING ARMS: 3 - A LITTLE
HELP NEEDED FOR BATHING: 3 - A LITTLE

## 2018-09-04 NOTE — ANESTHESIA POSTPROCEDURE EVALUATION
Patient: Celestino Hawkins    Procedure Summary     Date:  09/04/18 Room / Location:  [facility] OR 6 / [facility] OR    Anesthesia Start:  1144 Anesthesia Stop:  1522    Procedure:  Davinci Robot Assisted Prostatectomy possible Bilateral Pelvic Lymph Node Dissection ( Xi Robot ) (Bilateral ) Diagnosis:  (Malignant Neoplasm of Prostate C61)    Surgeon:  Rafita Cole MD Responsible Provider:  Suha Rubio MD    Anesthesia Type:  general ASA Status:  2          Anesthesia Type: general  PACU Vitals  9/4/2018 1517 - 9/4/2018 1543      9/4/2018 1520             BP: (!)  198/97    Temp: 36.9 °C (98.4 °F)    Pulse: 93    Resp: 14    SpO2: 94 %            Anesthesia Post Evaluation    Pain management: satisfactory to patient  Mode of pain management: IV medication  Patient location during evaluation: PACU  Patient participation: complete - patient participated  Level of consciousness: awake and alert  Cardiovascular status: acceptable  Airway Patency: adequate  Respiratory status: acceptable  Hydration status: stable  Anesthetic complications: no

## 2018-09-04 NOTE — OP NOTE
Davinci Robot Assisted Prostatectomy possible Bilateral Pelvic Lymph Node Dissection ( Xi Robot ) (B) Procedure Note     Procedure:    Davinci Robot Assisted Prostatectomy possible Bilateral Pelvic Lymph Node Dissection ( Xi Robot )  CPT(R) Code:  86941 - MO LAP,PROSTATECTOMY,RADICAL,W/NERVE SPARE,INCL ROBOTIC    Indications: This is a 52 y.o. male with a history of Sebastian 3+3 clinical stage T1c prostate cancer diagnosed May 14, 2018. Prostate gland examination is normal.  After reviewing his options, he opted to proceed with robotic-assisted laparoscopic prostatectomy.       * No Diagnosis Codes entered *    * No Diagnosis Codes entered *    Surgeon: Rafita Cole MD     Assistants: Celestino Murrieta MD    Anesthesia: General endotracheal anesthesia    ASA Class: 2      Procedure Details   This patient was identified in the preoperative holding area and then brought back to the operating room suite where anesthesia was administered.  He was placed in the low lithotomy position and then prepped and draped in the usual sterile fashion.  A timeout was performed.  A 20 [place]bourgish Wiggins catheter was inserted per urethra into the bladder.  A 2.5 cm incision was then made in the midline just above the umbilicus.  Skin and soft tissue was incised down to the rectus fascia.  A Veress needle was inserted in the abdomen and saline test confirmed intraperitoneal placement of the Veress needle.  The abdomen was insufflated to 15 mm Hg CO2 and an 8 mm trocar was inserted.  The abdomen was carefully surveyed and found to be free of any intra-abdominal adhesions.  8 mm trochars were then placed 9 cm to the left the right of midline.  Another 8 mm trocar was placed 9 cm to the left of the left port.  A 12 mm assistant trocar was placed 2 finger breaths above the right anterior superior iliac spine 17 cm from the symphysis pubis.  A 5 mm trocar was placed superolateral on the right.  The robot was then docked.    I began by dropping the  bladder in the midline using as the lateral margins of dissection the medial umbilical ligaments.  The prevesical space was entered and that plane carried all the way down to the retropubic space.  The prostate gland was cleaned of periprostatic fat.  Endopelvic fascia on the left and right-hand sides were sharply incised and the levator muscles bluntly reflected off of the surface of the prostate gland.  The puboprostatic ligaments were taken down with electrocautery.  The dorsal vein was controlled using a 0 Vicryl suture.  A 0 V-Loc suture was used in order to suspend the dorsal vein to the symphysis pubis.  The prostatovesical junction was defined and incised down to the level of the Wiggins catheter.  The Wiggins catheter balloon was deflated and the catheter then used in order to suspend the prostate gland anteriorly.  The posterior bladder neck dissection was then carried out down to the level of the vas deferens and the seminal vesicles.  The vas deferens were divided and the seminal vesicles mobilized using careful bipolar cautery and sharp and blunt dissection.  The seminal vesicles and vas deferens were then elevated in the midline exposing the posterior leaflet of Denonvillier's fascia.  This was incised and the posterior surface the prostate gland defined and that plane carried all the way down to the apex and then laterally as far as possible.  The neurovascular bundles were defined on the left and right-hand sides posteriorly and then carefully dissected away from the surface of the prostate gland.  Attention was then turned anteriorly where the inferior vesicle pedicles were controlled using a series of Hem-O-Rowdy clips.  The neurovascular bundles were then bluntly reflected off the surface of the prostate gland in a bilateral nerve sparing procedure.  The prostate gland at this point was attached only at the apex.  The dorsal vein was divided with electrocautery.  The urethra was divided sharply.      At  this point the bladder neck was reapproximated using a 3-0 Monocryl suture at the 3 and 9:00 positions.  The posterior plate was reapproximated using a single armed strophic suture in a Mychal stitch fashion.  The urethrovesical anastomosis was then carried out using a running continuous double-armed 3-0 Stratafix suture tied anteriorly.  A final 20 [place] Wiggins catheter was inserted and the urethrovesical anastomosis tested and found to be watertight.  Specimen was placed within an Endocatch bag.  Tthe robot was undocked.  The specimen was delivered through the midline.  The midline defect was closed using a series of interrupted figure-of-eight 0 Vicryl sutures.  4-0 Monocryl was used in order to close the skin.  Dermabond was applied as a sterile dressing.    The patient was then awakened and transferred in stable condition to the postoperative recovery area where his disposition is the floor.    Dr. Celestino Murrieta was discussed scrubbed this first assistant throughout the entirety of the operation.  He assisted and port placement, tissue retraction, suctioning, passage of instruments and final closure.  He was scrubbed this first assistant throughout.    Findings:  No gross evidence of extracapsular extension of disease    Estimated Blood Loss:  50 mL           Drains: None           Total IV Fluids: 1500 mL crystalloid           Specimens:   ID Type Source Tests Collected by Time Destination   1 : Vas Deferins and Prostate Tissue Prostate PATHOLOGY TISSUE EXAM Rafita Cole MD 9/4/2018 1443               Implants: * No implants in log *           Complications:  None           Disposition: PACU - hemodynamically stable.           Condition: stable    Rafita Cole MD

## 2018-09-04 NOTE — ANESTHESIA PROCEDURE NOTES
Airway  Urgency: elective    Start Time: 9/4/2018 11:57 AM  Airway not difficult    General Information and Staff    Patient location during procedure: OR  Anesthesiologist: SONAM RODRIGUEZ  Resident/CRNA: JADA QUEVEDO  Performed: resident/CRNA     Indications and Patient Condition  Indications for airway management: anesthesia  Sedation level: deep  Preoxygenated: yes  Patient position: sniffing  Mask difficulty assessment: 2 - vent by mask + OA or adjuvant +/- NMBA    Final Airway Details  Final airway type: endotracheal airway      Successful airway: ETT  Cuffed: yes   Successful intubation technique: direct laryngoscopy  Facilitating devices/methods: intubating stylet  Endotracheal tube insertion site: oral  Blade: Jaguar  Blade size: #4  ETT size: 8.0 mm  Cormack-Lehane Classification: grade I - full view of glottis  Placement verified by: chest auscultation and capnometry   Measured from: lips  ETT to lips (cm): 23  Number of attempts at approach: 1  Atraumatic airway insertion

## 2018-09-04 NOTE — ANESTHESIA PREPROCEDURE EVALUATION
Anesthesia ROS/MED HX    Anesthesia History    History of anesthetic complications  - PONV  Pulmonary - neg  Cardiovascular- neg   ECG reviewed  Hematological - neg  GI/Hepatic   GERD  Musculoskeletal- neg  Renal Disease- neg  Endo/Other  History of cancer and prostate cancer  Body Habitus: Obese      Past Surgical History:   Procedure Laterality Date   • COLONOSCOPY     • LUMBAR EPIDURAL INJECTION     • WISDOM TOOTH EXTRACTION         Physical Exam    Airway   Mallampati: III   TM distance: >3 FB   Neck ROM: full  Cardiovascular - normal   Rhythm: regular   Rate: normal  Pulmonary - normal   clear to auscultation  Dental    Teeth Problems: missing        Anesthesia Plan    Plan: general    Technique: general endotracheal     Airway: oral intubation   ASA 2  Anesthetic plan and risks discussed with: patient       Wt Readings from Last 3 Encounters:   08/27/18 103 kg (226 lb 11.2 oz)       Temp Readings from Last 3 Encounters:   08/27/18 36.3 °C (97.4 °F)       BP Readings from Last 3 Encounters:   08/27/18 (!) 138/100       Pulse Readings from Last 3 Encounters:   08/27/18 75       Lab Results   Component Value Date    WBC 7.46 08/27/2018    HGB 13.6 (L) 08/27/2018    HCT 41.4 08/27/2018    MCV 94.3 08/27/2018     08/27/2018       Lab Results   Component Value Date    GLUCOSE 96 08/27/2018    CALCIUM 9.4 08/27/2018     08/27/2018    K 4.0 08/27/2018    CO2 28 08/27/2018    CL 99 08/27/2018    BUN 14 08/27/2018    CREATININE 1.3 08/27/2018       No results found for: HCGPREGUR, PREGSERUM, HCG, HCGQUANT          Current Facility-Administered Medications   Medication Dose Route Frequency Provider Last Rate Last Dose   • ceFAZolin in sterile water (ANCEF) injection 2 g  2 g intravenous 60 min Pre-Op Rafita Cole MD       • sodium chloride 0.9 % infusion   intravenous Continuous Rafita Cole MD           Prior to Admission medications    Medication Sig Start Date End Date Taking? Authorizing Provider    abacavir-dolutegravir-lamivud (TRIUMEQ) 600- mg tablet tablet Take by mouth daily.   Yes Dave Dewitt MD   allopurinol (ZYLOPRIM) 100 mg tablet Take 200 mg by mouth daily.   Yes Dave Dewitt MD   crofelemer (MYTESI) 125 mg tablet,delayed release (DR/EC) Take 125 mg by mouth 2 (two) times a day.   Yes Dave Dewitt MD   lansoprazole (PREVACID) 30 mg capsule Take 30 mg by mouth daily before breakfast.   Yes Dave Dewitt MD       Patient Active Problem List   Diagnosis   • Preoperative examination   • Prostate cancer (CMS/[facility]) ([facility])   • HIV (human immunodeficiency virus infection) (CMS/[facility]) ([facility])   • Elevated BP without diagnosis of hypertension   • Right bundle branch block (RBBB) on electrocardiogram (ECG)       Past Medical History:   Diagnosis Date   • Borderline hypertension     no meds   • Diarrhea due to drug    • HIV (human immunodeficiency virus infection) (CMS/[facility]) ([facility]) 2000   • MRSA (methicillin resistant Staphylococcus aureus) 2011    RLE   • PONV (postoperative nausea and vomiting)        Past Surgical History:   Procedure Laterality Date   • COLONOSCOPY     • LUMBAR EPIDURAL INJECTION     • WISDOM TOOTH EXTRACTION               Current Facility-Administered Medications   Medication Dose Route Frequency Provider Last Rate Last Dose   • ceFAZolin in sterile water (ANCEF) injection 2 g  2 g intravenous 60 min Pre-Op Rafita Cole MD       • sodium chloride 0.9 % infusion   intravenous Continuous Rafita Cole MD           Prior to Admission medications    Medication Sig Start Date End Date Taking? Authorizing Provider   abacavir-dolutegravir-lamivud (TRIUMEQ) 600- mg tablet tablet Take by mouth daily.   Yes Dave Dewitt MD   allopurinol (ZYLOPRIM) 100 mg tablet Take 200 mg by mouth daily.   Yes Dave Dewitt MD   crofelemer (MYTESI) 125 mg tablet,delayed release (DR/EC) Take 125 mg by mouth 2 (two) times a day.   Yes Dave Dewitt MD    lansoprazole (PREVACID) 30 mg capsule Take 30 mg by mouth daily before breakfast.   Yes Provider, MD Dave

## 2018-09-04 NOTE — OR SURGEON
Pre-Procedure patient identification:  I am the primary operating surgeon/proceduralist and I have identified the patient on 09/04/18 at 10:53 AM Rafita Cole MD  Phone Number: [phone]

## 2018-09-04 NOTE — H&P (VIEW-ONLY)
[facility] Medicine Service -  Pre-Operative Consultation       Patient Name: Celestino Hawkins  Referring Surgeon: Dr. Rafita Cole    Reason for Referral: Pre-Operative Evaluation  Surgical Procedure: Da Trevon robot-assisted prostatectomy with possible pelvic lymph node dissection.  Operative Date: 9/4/2018.  Other Providers:      PCP: Pt States, No Pcp          HISTORY OF PRESENT ILLNESS      Celestino Hawkins is a 52 y.o. male presenting today to the [facility] at [facility] for pre-operative evaluation prior to planned surgery.    He currently feels well at this time.  He denies any urinary symptoms of frequency, urgency, hematuria, or dysuria.  Currently his only medication is Triumeq for his HIV.    In regards to medical history:  · HIV currently on HAART followed by infectious disease at [facility]  · MRSA of his lower extremity diagnosed in 2011    The patient denies any current or recent chest pain or pressure, dyspnea, cough, sputum, fevers, chills, abdominal pain, nausea, vomiting, diarrhea or other symptoms.     Functionally, the patient is able to ascend a flight or so of stairs with no dyspnea or chest pain.     The patient denies, on specific questioning, the following:  No history of MI, arrhythmia,or CHF.  No history of MILENA.  No history of DVT/PE.  No history of COPD.  No history of CVA.  No history of DM.   No history of CKD.     PAST MEDICAL AND SURGICAL HISTORY      Past Medical History:   Diagnosis Date   • Borderline hypertension     no meds   • Diarrhea due to drug    • HIV (human immunodeficiency virus infection) (CMS/HCC) (HCC) 2000   • MRSA (methicillin resistant Staphylococcus aureus) 2011    RLE   • PONV (postoperative nausea and vomiting)        Past Surgical History:   Procedure Laterality Date   • COLONOSCOPY     • LUMBAR EPIDURAL INJECTION     • WISDOM TOOTH EXTRACTION         MEDICATIONS        Current Outpatient  Prescriptions:   •  abacavir-dolutegravir-lamivud (TRIUMEQ) 600- mg tablet tablet, Take by mouth daily., Disp: , Rfl:   •  allopurinol (ZYLOPRIM) 100 mg tablet, Take 200 mg by mouth daily., Disp: , Rfl:   •  crofelemer (MYTESI) 125 mg tablet,delayed release (DR/EC), Take 125 mg by mouth 2 (two) times a day., Disp: , Rfl:   •  lansoprazole (PREVACID) 30 mg capsule, Take 30 mg by mouth daily before breakfast., Disp: , Rfl:     ALLERGIES      Patient has no known allergies.    FAMILY HISTORY      family history is not on file.    Denies any prior known family history of DVTs/PEs/clotting disorder    SOCIAL HISTORY      Social History   Substance Use Topics   • Smoking status: Never Smoker   • Smokeless tobacco: Never Used   • Alcohol use No       REVIEW OF SYSTEMS      All other systems reviewed and negative except as noted in HPI    PHYSICAL EXAMINATION      BP (!) 138/100 (BP Location: Left upper arm, Patient Position: Sitting)   Pulse 75   Temp 36.3 °C (97.4 °F)   Resp 16 Comment: 96%  Ht 1.829 m (6')   Wt 103 kg (226 lb 11.2 oz)   BMI 30.75 kg/m²   Body mass index is 30.75 kg/m².  GEN: well-developed and well-nourished; not in acute distress  HEENT: normocephalic; atraumatic  NECK: no JVD; no bruits  CARDIO: regular rate and rhythm; no murmurs or rubs  RESP: clear to auscultation bilaterally; no rales, rhonchi, or wheezes  ABD: soft, non-distended, non-tender, normal bowel sounds  EXT: no cyanosis, clubbing, or edema  SKIN: clean, dry, warm, and intact  MUSCULOSKELETAL: no injury or deformity  NEURO: alert and oriented x 3; nonfocal  BEHAVIOR/EMOTIONAL: appropriate; cooperative    LABS / EKG        Labs  Lab Results   Component Value Date     08/27/2018    K 4.0 08/27/2018    CL 99 08/27/2018    BUN 14 08/27/2018    CREATININE 1.3 08/27/2018    WBC 7.46 08/27/2018    HGB 13.6 (L) 08/27/2018    HCT 41.4 08/27/2018     08/27/2018       ECG/Telemetry  Normal sinus rhythm at a rate of 75 bpm,  QTC is 500, right bundle branch block is present.  There are no old EKGs to compare to this.    ASSESSMENT AND PLAN         Preoperative examination  Patient presents for a preoperative exam prior to his robot-assisted prostatectomy with pelvic lymph node dissection.  Remainder of his medical issues are as below.    HIV (human immunodeficiency virus infection) (CMS/HCC) (HCC)  Patient is currently on HAART therapy (Triumeq) being treated at [facility].  He is compliant with medications.  He states his counts are stable as this is viral load.  We have no labs available in our system for him.  He should continue his HAART therapy in the hospital.  He was instructed to bring his own medication as this is not likely not formulary at [facility].    Prostate cancer (CMS/HCC) (HCC)  Prostate cancer officially diagnosed in 5/2018 after biopsy due to an elevated PSA over the past year.  We have no record of pathology reports in our EMR.  He is set to undergo a robot-assisted prostatectomy with possible lymph node dissection on 9/4/2018 with Dr. Rafita Cole.    Right bundle branch block (RBBB) on electrocardiogram (ECG)  EKG revealed normal sinus rhythm at a rate of 75 with a QTc of 500 and a right bundle branch block.  We have no previous EKG to compare, so it is unclear if this right bundle is new.  He currently has no cardiac symptoms concerning for ischemic disease at this time.  I do not believe this RBBB is significantly pathologic.  Please obtain new EKG prior to surgery to compare.  Please be cautious of using any QTc prolonging agents if his QTc remains prolonged prior to surgery.       In regards to perioperative cardiac risk:  The patient denies any history of ischemic heart disease, denies any history of CHF, denies any history of CVA, is not on pre-operative treatment with insulin, and does not have a pre-operative creatinine > 2 mg/dL.   The Revised Cardiac Risk Index (RCRI) for this patient indicates low risk.      Further comments:  Resume supplements when OK with surgical team.  I would encourage incentive spirometry to assist with minimizing alen-operative pulmonary risk.  DVT prophylaxis and timing of such per the discretion of the surgeon.     Please do not hesitate to contact [facility] during the upcoming hospitalization with any questions or concerns.     Ramos Villatoro MD  8/28/2018

## 2018-09-05 VITALS
TEMPERATURE: 98.3 F | BODY MASS INDEX: 30.48 KG/M2 | DIASTOLIC BLOOD PRESSURE: 85 MMHG | WEIGHT: 225 LBS | RESPIRATION RATE: 18 BRPM | OXYGEN SATURATION: 93 % | HEIGHT: 72 IN | HEART RATE: 83 BPM | SYSTOLIC BLOOD PRESSURE: 158 MMHG

## 2018-09-05 PROCEDURE — 63600000 HC DRUGS/DETAIL CODE: Performed by: UROLOGY

## 2018-09-05 PROCEDURE — 63700000 HC SELF-ADMINISTRABLE DRUG: Performed by: UROLOGY

## 2018-09-05 RX ORDER — OXYCODONE AND ACETAMINOPHEN 5; 325 MG/1; MG/1
1-2 TABLET ORAL EVERY 6 HOURS PRN
Qty: 15 TABLET | Refills: 0 | Status: SHIPPED | OUTPATIENT
Start: 2018-09-05 | End: 2018-09-10

## 2018-09-05 RX ORDER — CIPROFLOXACIN 500 MG/1
TABLET ORAL
Qty: 6 TABLET | Refills: 0 | Status: SHIPPED | OUTPATIENT
Start: 2018-09-05 | End: 2018-09-08

## 2018-09-05 RX ORDER — DOCUSATE SODIUM 100 MG/1
100 CAPSULE, LIQUID FILLED ORAL 2 TIMES DAILY
Qty: 30 CAPSULE | Refills: 0 | Status: SHIPPED | OUTPATIENT
Start: 2018-09-05 | End: 2018-10-05

## 2018-09-05 RX ADMIN — ALLOPURINOL 200 MG: 100 TABLET ORAL at 08:37

## 2018-09-05 RX ADMIN — KETOROLAC TROMETHAMINE 15 MG: 15 INJECTION, SOLUTION INTRAMUSCULAR; INTRAVENOUS at 11:58

## 2018-09-05 RX ADMIN — ABACAVIR AND LAMIVUDINE 1 TABLET: 600; 300 TABLET, FILM COATED ORAL at 08:37

## 2018-09-05 RX ADMIN — KETOROLAC TROMETHAMINE 15 MG: 15 INJECTION, SOLUTION INTRAMUSCULAR; INTRAVENOUS at 05:31

## 2018-09-05 RX ADMIN — DOLUTEGRAVIR SODIUM 50 MG: 50 TABLET, FILM COATED ORAL at 08:37

## 2018-09-05 RX ADMIN — PANTOPRAZOLE SODIUM 40 MG: 40 TABLET, DELAYED RELEASE ORAL at 08:37

## 2018-09-05 RX ADMIN — Medication 2 G: at 04:01

## 2018-09-05 NOTE — PROGRESS NOTES
Comfortable    BP (!) 158/85 (BP Location: Left upper arm, Patient Position: Lying)   Pulse 83   Temp 36.8 °C (98.3 °F) (Oral)   Resp 18   Ht 1.829 m (6')   Wt 102 kg (225 lb)   SpO2 93%   BMI 30.52 kg/m²     Abdomen soft    Incisions clean and intact    Wiggins clear    1.  Prostate cancer - POD#1 from RALP.  Leg bag teaching.  Heplock IV.  OOB and ambulate.

## 2018-09-05 NOTE — PROGRESS NOTES
Patient: Celestino Hawkins  Procedure(s) with comments:  Davinci Robot Assisted Prostatectomy possible Bilateral Pelvic Lymph Node Dissection ( Xi Robot ) - MD pref Xi Robot  Patient location: [facility] Surgical Floor    Last vitals:   Vitals:    09/05/18 0000   BP: 135/71   Pulse: 82   Resp: 18   Temp: 36.9 °C (98.5 °F)   SpO2: 98%     Level of consciousness: awake, alert and oriented  Post-anesthesia pain: adequate analgesia  Anesthetic complications: no

## 2018-09-05 NOTE — PLAN OF CARE
Problem: Patient Care Overview  Goal: Plan of Care Review  Outcome: Ongoing (interventions implemented as appropriate)   09/05/18 [id number]   Coping/Psychosocial   Plan Of Care Reviewed With patient   Plan of Care Review   Progress improving   Outcome Summary Pt c/o severe pain at start of shift. Tolerated ATC toradol and prn dilaudid iv for pain. Wiggins draining to gravity, was red at start of shift but color improved, now more mc. Ambulated in room and halls x3 overnight once pain controlled.       Problem: Prostatectomy, Radical (Adult)  Goal: Signs and Symptoms of Listed Potential Problems Will be Absent, Minimized or Managed (Prostatectomy, Radical)  Outcome: Outcome(s) Achieved Date Met: 09/05/18    Goal: Anesthesia/Sedation Recovery  Outcome: Outcome(s) Achieved Date Met: 09/05/18

## 2018-09-07 LAB
CASE RPRT: NORMAL
CLINICAL INFO: NORMAL
PATH REPORT.FINAL DX SPEC: NORMAL
PATH REPORT.GROSS SPEC: NORMAL
PATHOLOGY SYNOPTIC REPORT: NORMAL

## 2018-12-17 ENCOUNTER — APPOINTMENT (OUTPATIENT)
Dept: LAB | Facility: HOSPITAL | Age: 53
End: 2018-12-17
Attending: UROLOGY
Payer: COMMERCIAL

## 2018-12-17 ENCOUNTER — TRANSCRIBE ORDERS (OUTPATIENT)
Dept: LAB | Facility: HOSPITAL | Age: 53
End: 2018-12-17

## 2018-12-17 DIAGNOSIS — C61 MALIGNANT NEOPLASM OF PROSTATE (CMS/HCC): Primary | ICD-10-CM

## 2018-12-17 DIAGNOSIS — C61 MALIGNANT NEOPLASM OF PROSTATE (CMS/HCC): ICD-10-CM

## 2018-12-17 LAB — PSA SERPL-MCNC: <0.01 NG/ML

## 2018-12-17 PROCEDURE — 36415 COLL VENOUS BLD VENIPUNCTURE: CPT

## 2018-12-17 PROCEDURE — 84153 ASSAY OF PSA TOTAL: CPT

## 2022-07-20 ENCOUNTER — APPOINTMENT (OUTPATIENT)
Dept: UROLOGY | Facility: CLINIC | Age: 57
End: 2022-07-20

## (undated) DEVICE — ***USE 138537*** SUTURE VICRYL 0 J340H CT-1 27IN VIOLET

## (undated) DEVICE — COVER CAMERA LIGHT HANDLE

## (undated) DEVICE — SHEARS TIP COVER DAVINCI ONE USE

## (undated) DEVICE — TROCAR 1ST ENTRY 12 X 100MM ADV FIX

## (undated) DEVICE — ***USE 56941*** SUTURE VICRYL 0 J603H UR-6

## (undated) DEVICE — Device

## (undated) DEVICE — TUBING STRYKEFLOW SUCT/IRRIG 10IN

## (undated) DEVICE — POUCH STERI DRAPE INSTRUMENT LONG

## (undated) DEVICE — STAPLER SKIN 35W PROXIMATE PLUS

## (undated) DEVICE — DRESSING TELFA 3X4

## (undated) DEVICE — SYSTEM VISUALIZATION CLEARIFY

## (undated) DEVICE — SYRINGE DISP LUER-LOK 50 CC

## (undated) DEVICE — DRAPE ARM DAVINCI XI

## (undated) DEVICE — SYSTEM LABELING CORRECT MEDICATION

## (undated) DEVICE — SUTURE STRATAFIX 3-0 30 CM

## (undated) DEVICE — SEAL UNIVERSAL 5MM-8MM XI

## (undated) DEVICE — GLOVE SURG PROTEXIS PF 7.0 2D72NS70X

## (undated) DEVICE — SANI-SERVE SLUSH DRAPE SUBSTIT

## (undated) DEVICE — APPLICATOR CHLORAPREP 26ML ORANGE TINT

## (undated) DEVICE — OINTMENT SURGILUBE 4OZ TUBE

## (undated) DEVICE — SOLN IRRIG STER WATER 1000ML

## (undated) DEVICE — ***USE 138686*** SUTURE MONOCRYL 3-0 RB-1 DYED Y305H

## (undated) DEVICE — SUTURE VLOC 2-0 90 UNDYED 1X9 GS-22

## (undated) DEVICE — COVER LIGHTHANDLE (STERILE SINGLE PA

## (undated) DEVICE — PAD POSITIONING XL W/ARM PROTECTORS

## (undated) DEVICE — DRAPE COLUMN DAVINCI XI

## (undated) DEVICE — MARKER SURGICAL SKIN

## (undated) DEVICE — ASSEMBLY MARYLAND BIPOLAR FORCEPS 8MM DAVINCI 2 REPOSABLE

## (undated) DEVICE — ADHESIVE SKIN DERMABOND ADVANCED 0.7ML

## (undated) DEVICE — SUTURE MONOCRYL 4-0 Y426H PS-2 27IN

## (undated) DEVICE — CLIPS HEMOLOK XL

## (undated) DEVICE — CATH PLUG

## (undated) DEVICE — ***USE 138152*** RETRIEVAL SPECIMEN INZII 10MM

## (undated) DEVICE — PORT ACCESS 5MM W/ BLADELESS OPTICAL TIP 120MM LONG

## (undated) DEVICE — PAD GROUND ELECTROSURGICAL W/CORD

## (undated) DEVICE — GLOVE SURG PROTEXIS PF 7.5

## (undated) DEVICE — SOLUTION ELECTROLUBE ANTI-STICK

## (undated) DEVICE — SUTURE STRATAFIX PGA 3-0 RB-1 TAPER 16CM

## (undated) DEVICE — SET AIRSEAL FILTERED TUBE SET

## (undated) DEVICE — MANIFOLD SINGLE PORT NEPTUNE